# Patient Record
Sex: FEMALE | Race: WHITE
[De-identification: names, ages, dates, MRNs, and addresses within clinical notes are randomized per-mention and may not be internally consistent; named-entity substitution may affect disease eponyms.]

---

## 2023-01-12 ENCOUNTER — P2P PATIENT RECORD (OUTPATIENT)
Age: 48
End: 2023-01-12

## 2023-02-17 ENCOUNTER — DASHBOARD ENCOUNTERS (OUTPATIENT)
Age: 48
End: 2023-02-17

## 2023-02-17 ENCOUNTER — OFFICE VISIT (OUTPATIENT)
Dept: URBAN - NONMETROPOLITAN AREA CLINIC 4 | Facility: CLINIC | Age: 48
End: 2023-02-17
Payer: COMMERCIAL

## 2023-02-17 ENCOUNTER — WEB ENCOUNTER (OUTPATIENT)
Dept: URBAN - NONMETROPOLITAN AREA CLINIC 4 | Facility: CLINIC | Age: 48
End: 2023-02-17

## 2023-02-17 VITALS
WEIGHT: 158.8 LBS | TEMPERATURE: 98.2 F | HEART RATE: 76 BPM | BODY MASS INDEX: 26.46 KG/M2 | HEIGHT: 65 IN | DIASTOLIC BLOOD PRESSURE: 80 MMHG | SYSTOLIC BLOOD PRESSURE: 114 MMHG

## 2023-02-17 DIAGNOSIS — K21.9 GASTROESOPHAGEAL REFLUX DISEASE, UNSPECIFIED WHETHER ESOPHAGITIS PRESENT: ICD-10-CM

## 2023-02-17 DIAGNOSIS — K31.84 GASTROPARESIS: ICD-10-CM

## 2023-02-17 DIAGNOSIS — K59.00 CONSTIPATION, UNSPECIFIED CONSTIPATION TYPE: ICD-10-CM

## 2023-02-17 PROBLEM — 235595009: Status: ACTIVE | Noted: 2023-02-17

## 2023-02-17 PROBLEM — 235675006: Status: ACTIVE | Noted: 2023-02-17

## 2023-02-17 PROBLEM — 14760008: Status: ACTIVE | Noted: 2023-02-17

## 2023-02-17 PROCEDURE — 99204 OFFICE O/P NEW MOD 45 MIN: CPT | Performed by: REGISTERED NURSE

## 2023-02-17 RX ORDER — AMITRIPTYLINE HYDROCHLORIDE 10 MG/1
1 TABLET AT BEDTIME TABLET, FILM COATED ORAL ONCE A DAY
Qty: 30 | Refills: 2 | OUTPATIENT

## 2023-02-17 NOTE — HPI-TODAY'S VISIT:
2/17/23: Pt is a 47 yo female with PMH of idiopathic gastroparesis and GERD who was referred by Dr. Jazmin Fall for evaluation and management of gastroparaesis.   Pt diagnosed with idiopathic gastroparesis by [facility] in Nov 2022. Had been following [place] and started on Reglan 5 mg, which she had taken TID for 3 weeks but did not notice any improvement. Takes Zofran PRN. Has Hx of GERD and takes Protonix daily. She has had EGD which showed "irritation," otherwise unremarkable. She follows [facility] diet. She continues to c/o abdominal pain, bloating, nausea and occasional vomiting. She currenlty only takes Reglan if she goes out to eat. Drinks a lot of protein shakes. Typicall has BM every 3 days. Stools occasionally hard and admits to straining. She has never had a screening colonoscopy. No FHx of colon cancer.

## 2023-04-10 ENCOUNTER — OFFICE VISIT (OUTPATIENT)
Dept: URBAN - NONMETROPOLITAN AREA CLINIC 4 | Facility: CLINIC | Age: 48
End: 2023-04-10

## 2025-06-10 ENCOUNTER — OFFICE VISIT (OUTPATIENT)
Dept: URBAN - NONMETROPOLITAN AREA CLINIC 4 | Facility: CLINIC | Age: 50
End: 2025-06-10
Payer: COMMERCIAL

## 2025-06-10 ENCOUNTER — LAB OUTSIDE AN ENCOUNTER (OUTPATIENT)
Dept: URBAN - NONMETROPOLITAN AREA CLINIC 4 | Facility: CLINIC | Age: 50
End: 2025-06-10

## 2025-06-10 DIAGNOSIS — K21.9 GASTROESOPHAGEAL REFLUX DISEASE, UNSPECIFIED WHETHER ESOPHAGITIS PRESENT: ICD-10-CM

## 2025-06-10 DIAGNOSIS — Z15.89 CHEK2 GENE MUTATION POSITIVE: ICD-10-CM

## 2025-06-10 DIAGNOSIS — K31.84 GASTROPARESIS: ICD-10-CM

## 2025-06-10 PROCEDURE — 99213 OFFICE O/P EST LOW 20 MIN: CPT | Performed by: REGISTERED NURSE

## 2025-06-10 RX ORDER — AMITRIPTYLINE HYDROCHLORIDE 10 MG/1
1 TABLET AT BEDTIME TABLET, FILM COATED ORAL ONCE A DAY
Qty: 30 | Refills: 2 | Status: ACTIVE | COMMUNITY

## 2025-06-10 RX ORDER — DULOXETINE 40 MG/1
TAKE ONE CAPSULE BY MOUTH ONE TIME DAILY CAPSULE, DELAYED RELEASE ORAL
Qty: 30 UNSPECIFIED | Refills: 0 | Status: ACTIVE | COMMUNITY

## 2025-06-10 RX ORDER — CLONAZEPAM 2 MG/1
TAKE ONE TABLET BY MOUTH ONE TIME DAILY TABLET ORAL
Qty: 30 UNSPECIFIED | Refills: 0 | Status: ACTIVE | COMMUNITY

## 2025-06-10 RX ORDER — PANTOPRAZOLE SODIUM 40 MG/1
TAKE ONE TABLET BY MOUTH TWICE A DAY AS NEEDED TABLET, DELAYED RELEASE ORAL
Qty: 60 UNSPECIFIED | Refills: 1 | Status: ACTIVE | COMMUNITY

## 2025-06-10 RX ORDER — ANASTROZOLE 1 MG/1
TAKE ONE TABLET BY MOUTH ONE TIME DAILY, SWALLOW WHOLE WITH A DRINK OF WATER TABLET ORAL
Qty: 30 UNSPECIFIED | Refills: 5 | Status: ACTIVE | COMMUNITY

## 2025-06-10 RX ORDER — CYCLOBENZAPRINE HYDROCHLORIDE 5 MG/1
TAKE ONE TABLET BY MOUTH TWICE A DAY AS NEEDED FOR MUSCLE SPASMS TABLET, FILM COATED ORAL
Qty: 60 UNSPECIFIED | Refills: 0 | Status: ACTIVE | COMMUNITY

## 2025-06-10 RX ORDER — AMITRIPTYLINE HYDROCHLORIDE 25 MG/1
TAKE ONE TABLET BY MOUTH ONE TIME DAILY TABLET, FILM COATED ORAL
Qty: 30 UNSPECIFIED | Refills: 0 | Status: ACTIVE | COMMUNITY

## 2025-06-10 NOTE — HPI-TODAY'S VISIT:
2/17/23: Pt is a 49 yo female with PMH of idiopathic gastroparesis and GERD who was referred by Dr. Jazmin Fall for evaluation and management of gastroparaesis.   Pt diagnosed with idiopathic gastroparesis by [facility] in Nov 2022. Had been following [place] and started on Reglan 5 mg, which she had taken TID for 3 weeks but did not notice any improvement. Takes Zofran PRN. Has Hx of GERD and takes Protonix daily. She has had EGD which showed "irritation," otherwise unremarkable. She follows [facility] diet. She continues to c/o abdominal pain, bloating, nausea and occasional vomiting. She currenlty only takes Reglan if she goes out to eat. Drinks a lot of protein shakes. Typically has BM every 3 days. Stools occasionally hard and admits to straining. She has never had a screening colonoscopy. No FHx of colon cancer.  6/10/25: Pt was referred by Dr. 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  There is no family history of colon cancer. Father has colon polyps. Patient denies change in bowel habits, appetite and weight. Patient denies bleeding per rectum. Recent blood work normal.  Patient is not on anticoagulants/antiplatelets, have a pacemaker/defibrillator, cardiac arrythmia or heart failure. The patient has not had a recent (< 3 months) MI, CVA, TIA, CAD/stents. Patient pineda not have cardiac valvulopathy, history of anesthesia complications including high fever with anesthesia. The patient is not on dialysis and does not have a seizure disorder, need for continuous home O2, severe pulmonary disease, or need for assistance with walking or transferring from surface-to-surface. The patient does not have a BMI of 50 or above.

## 2025-08-05 ENCOUNTER — OFFICE VISIT (OUTPATIENT)
Dept: URBAN - METROPOLITAN AREA SURGERY CENTER 14 | Facility: SURGERY CENTER | Age: 50
End: 2025-08-05